# Patient Record
Sex: FEMALE | Race: WHITE | Employment: FULL TIME | URBAN - NONMETROPOLITAN AREA
[De-identification: names, ages, dates, MRNs, and addresses within clinical notes are randomized per-mention and may not be internally consistent; named-entity substitution may affect disease eponyms.]

---

## 2018-08-23 ENCOUNTER — HOSPITAL ENCOUNTER (EMERGENCY)
Age: 43
Discharge: HOME | End: 2018-08-23
Payer: COMMERCIAL

## 2018-08-23 VITALS
HEIGHT: 61 IN | BODY MASS INDEX: 29.27 KG/M2 | DIASTOLIC BLOOD PRESSURE: 88 MMHG | SYSTOLIC BLOOD PRESSURE: 129 MMHG | OXYGEN SATURATION: 98 % | RESPIRATION RATE: 16 BRPM | WEIGHT: 155 LBS | HEART RATE: 74 BPM | TEMPERATURE: 99.2 F

## 2018-08-23 DIAGNOSIS — L25.5 CONTACT DERMATITIS DUE TO PLANT: Primary | ICD-10-CM

## 2018-08-23 PROCEDURE — 6360000002 HC RX W HCPCS: Performed by: NURSE PRACTITIONER

## 2018-08-23 PROCEDURE — 99203 OFFICE O/P NEW LOW 30 MIN: CPT | Performed by: NURSE PRACTITIONER

## 2018-08-23 PROCEDURE — 99202 OFFICE O/P NEW SF 15 MIN: CPT

## 2018-08-23 PROCEDURE — 2709999900 HC NON-CHARGEABLE SUPPLY

## 2018-08-23 PROCEDURE — 96372 THER/PROPH/DIAG INJ SC/IM: CPT

## 2018-08-23 RX ORDER — METHYLPREDNISOLONE ACETATE 80 MG/ML
80 INJECTION, SUSPENSION INTRA-ARTICULAR; INTRALESIONAL; INTRAMUSCULAR; SOFT TISSUE ONCE
Status: COMPLETED | OUTPATIENT
Start: 2018-08-23 | End: 2018-08-23

## 2018-08-23 RX ORDER — PREDNISONE 20 MG/1
TABLET ORAL
Qty: 26 TABLET | Refills: 0 | Status: SHIPPED | OUTPATIENT
Start: 2018-08-23 | End: 2021-07-13

## 2018-08-23 RX ORDER — METHYLPREDNISOLONE ACETATE 40 MG/ML
40 INJECTION, SUSPENSION INTRA-ARTICULAR; INTRALESIONAL; INTRAMUSCULAR; SOFT TISSUE ONCE
Status: COMPLETED | OUTPATIENT
Start: 2018-08-23 | End: 2018-08-23

## 2018-08-23 RX ADMIN — METHYLPREDNISOLONE ACETATE 40 MG: 40 INJECTION, SUSPENSION INTRA-ARTICULAR; INTRALESIONAL; INTRAMUSCULAR; SOFT TISSUE at 19:49

## 2018-08-23 RX ADMIN — METHYLPREDNISOLONE ACETATE 80 MG: 80 INJECTION, SUSPENSION INTRA-ARTICULAR; INTRALESIONAL; INTRAMUSCULAR; SOFT TISSUE at 19:49

## 2018-08-23 NOTE — ED TRIAGE NOTES
Pt ambulatory to [facility] with a rash to her arms, legs, left breast, and genital area. Pt may have been exposed to poison ivy while camping last Sunday. Pt states the rash itches.

## 2018-08-24 ASSESSMENT — ENCOUNTER SYMPTOMS
RHINORRHEA: 0
SINUS PRESSURE: 0
TROUBLE SWALLOWING: 0
STRIDOR: 0
CHEST TIGHTNESS: 0
WHEEZING: 0
ABDOMINAL PAIN: 0
VOMITING: 0
PERI-ORBITAL EDEMA: 0
SHORTNESS OF BREATH: 0
HOARSE VOICE: 0
SORE THROAT: 0
THROAT SWELLING: 0
VOICE CHANGE: 0
COUGH: 0
DIARRHEA: 0
NAUSEA: 0

## 2018-08-24 NOTE — ED PROVIDER NOTES
Neurological: Negative for headaches. PAST MEDICAL HISTORY   No past medical history on file. SURGICAL HISTORY     Patient  has no past surgical history on file. CURRENT MEDICATIONS       Discharge Medication List as of 8/23/2018  7:47 PM          ALLERGIES     Patient is has No Known Allergies. FAMILY HISTORY     Patient's family history is not on file. SOCIAL HISTORY     Patient  reports that she has never smoked. She has never used smokeless tobacco. She reports that she drinks alcohol. She reports that she does not use drugs. PHYSICAL EXAM     ED TRIAGE VITALS  BP: 129/88, Temp: 99.2 °F (37.3 °C), Pulse: 74, Resp: 16, SpO2: 98 %  Physical Exam   Constitutional: She is oriented to person, place, and time. Vital signs are normal. She appears well-developed and well-nourished. Non-toxic appearance. She does not have a sickly appearance. She does not appear ill. No distress. HENT:   Head: Normocephalic and atraumatic. Right Ear: Hearing and external ear normal.   Left Ear: Hearing and external ear normal.   Nose: Nose normal.   Mouth/Throat: Uvula is midline, oropharynx is clear and moist and mucous membranes are normal.   Eyes: Conjunctivae and lids are normal. Right eye exhibits no discharge. Left eye exhibits no discharge. Neck: Normal range of motion. Neck supple. Cardiovascular: Normal rate, regular rhythm, S1 normal, S2 normal and normal heart sounds. No murmur heard. Pulmonary/Chest: Effort normal and breath sounds normal. No accessory muscle usage or stridor. No respiratory distress. She has no decreased breath sounds. She has no wheezes. She has no rhonchi. She has no rales. Neurological: She is alert and oriented to person, place, and time. Skin: Skin is warm, dry and intact. Rash (scattered vesicular rash to left breast, bilateral forearms, lower anterior legs, and vaginal area.  ) noted. Rash is vesicular. She is not diaphoretic. No pallor.    Psychiatric: She has a normal mood and affect. Nursing note and vitals reviewed. DIAGNOSTIC RESULTS   Labs:No results found for this visit on 08/23/18. IMAGING:    URGENT CARE COURSE:     Vitals:    08/23/18 1928 08/23/18 2004   BP: (!) 137/90 129/88   Pulse: 70 74   Resp: 16 16   Temp: 99.2 °F (37.3 °C)    TempSrc: Temporal    SpO2: 97% 98%   Weight: 155 lb (70.3 kg)    Height: 5' 1\" (1.549 m)        Medications   methylPREDNISolone acetate (DEPO-MEDROL) injection 80 mg (80 mg Intramuscular Given 8/23/18 1949)   methylPREDNISolone acetate (DEPO-MEDROL) injection 40 mg (40 mg Intramuscular Given 8/23/18 1949)     PROCEDURES:  None  FINAL IMPRESSION      1. Contact dermatitis due to plant        DISPOSITION/PLAN   DISPOSITION Decision To Discharge 08/23/2018 07:47:06 PM   120 mg Depo Medrol IM given tolerated during encounter  Prednisone taper given   Take Medication as Directed  Benadryl for itch, Don't scratch  Monitor for any increase in size or spreading  Monitor for fever or chills  Keep drainage covered if any. Follow up with your PCP or return as needed  Or go to the emergency Department     PATIENT REFERRED TO:  [address] [facility]  [address] [place] [address]  [phone]  In 1 week  If no improvement of symptoms    Patient instructed to follow up with PCP. If symptoms worsen, become severe or new symptoms develop patient instructed to go to the emergency room immediately. DISCHARGE MEDICATIONS:  Discharge Medication List as of 8/23/2018  7:47 PM      START taking these medications    Details   predniSONE (DELTASONE) 20 MG tablet 60 mg once a day for 4 days 40 mg once a day for 4 days 20 mg once a day for  6 days, Disp-26 tablet, R-0Normal           Discharge Medication List as of 8/23/2018  7:47 PM          Patient given educational materials - see patient instructions. Discussed use, benefit, and side effects of prescribed medications. All patient questions answered.   Pt voiced

## 2019-08-20 ENCOUNTER — HOSPITAL ENCOUNTER (OUTPATIENT)
Dept: GENERAL RADIOLOGY | Age: 44
Discharge: HOME | End: 2019-08-20
Payer: COMMERCIAL

## 2019-08-20 ENCOUNTER — HOSPITAL ENCOUNTER (OUTPATIENT)
Age: 44
Discharge: HOME | End: 2019-08-20
Payer: COMMERCIAL

## 2019-08-20 DIAGNOSIS — M79.641 PAIN OF RIGHT HAND: ICD-10-CM

## 2019-08-20 PROCEDURE — 73130 X-RAY EXAM OF HAND: CPT

## 2021-07-13 ENCOUNTER — OFFICE VISIT (OUTPATIENT)
Dept: PRIMARY CARE CLINIC | Age: 46
End: 2021-07-13

## 2021-07-13 VITALS
DIASTOLIC BLOOD PRESSURE: 88 MMHG | HEIGHT: 61 IN | HEART RATE: 88 BPM | BODY MASS INDEX: 29.45 KG/M2 | RESPIRATION RATE: 20 BRPM | WEIGHT: 156 LBS | SYSTOLIC BLOOD PRESSURE: 132 MMHG | TEMPERATURE: 98.4 F | OXYGEN SATURATION: 100 %

## 2021-07-13 DIAGNOSIS — Z00.00 WELLNESS EXAMINATION: Primary | ICD-10-CM

## 2021-07-13 RX ORDER — MEDROXYPROGESTERONE ACETATE 5 MG/1
TABLET ORAL
COMMUNITY
Start: 2021-06-17 | End: 2021-10-27

## 2021-07-13 RX ORDER — ERYTHROMYCIN 5 MG/G
OINTMENT OPHTHALMIC
Qty: 1 TUBE | Status: CANCELLED | OUTPATIENT
Start: 2021-07-13

## 2021-07-13 RX ORDER — ESTRADIOL 0.5 MG/1
TABLET ORAL
COMMUNITY
Start: 2021-06-17 | End: 2021-10-27

## 2021-07-13 ASSESSMENT — ENCOUNTER SYMPTOMS
CONSTIPATION: 0
PHOTOPHOBIA: 0
ABDOMINAL PAIN: 0
VOMITING: 0
SHORTNESS OF BREATH: 0
DIARRHEA: 0
EYE DISCHARGE: 0
NAUSEA: 0
ABDOMINAL DISTENTION: 0
EYE PAIN: 0
RECTAL PAIN: 1
CHEST TIGHTNESS: 0
EYE ITCHING: 0
EYE REDNESS: 1

## 2021-07-13 ASSESSMENT — PATIENT HEALTH QUESTIONNAIRE - PHQ9
1. LITTLE INTEREST OR PLEASURE IN DOING THINGS: 0
SUM OF ALL RESPONSES TO PHQ9 QUESTIONS 1 & 2: 0
SUM OF ALL RESPONSES TO PHQ QUESTIONS 1-9: 0
SUM OF ALL RESPONSES TO PHQ QUESTIONS 1-9: 0
2. FEELING DOWN, DEPRESSED OR HOPELESS: 0
SUM OF ALL RESPONSES TO PHQ QUESTIONS 1-9: 0

## 2021-07-13 NOTE — PROGRESS NOTES
Michela  [facility] [address] [facility]  Dept: [phone]  Loc: [facility] (:  1975) is a 48 y.o. female,New patient, here for evaluation of the following chief complaint(s):  Establish Care      ASSESSMENT/PLAN:  1. Wellness examination  -     Comprehensive Metabolic Panel; Future  -     TSH with Reflex; Future  -     Lipid Panel; Future  -     Vitamin D 25 Hydroxy; Future      She is to bring in her medications for Migraines and Suppository for Hemorrhoids for refills. She did not have dosing available today. Prior records reviewed from Dr. Laura Bowman office  No [place] records were available. Her right sclera is red injected: no signs of drainage, eye pain reported , dry eye history or any allergie reported. She recently had eye evaluation by her Ophthalmologic provider and she reported eye health was normal.  Denies any blurred vision, floaters, pain or irritation    Recommended otc eye drops to moisten and lubricate the eye and seek treatment if progresses. Body mass index is 29.48 kg/m². Screening:  Routine Dental and Eye  Last pap 2 years: history of partial hysterectomy and ablation  Declines Mammogram    Pending labs for wellness evaluation        No follow-ups on file. SUBJECTIVE/OBJECTIVE:  48year old female here to establish care   Healthy appearing female  She comes from Dr. Laura Bowman in [facility]  She has been treated for Migraines chronically, this was dx in the [place] system    She is taking oral hormones for decreased libido and menopausal type symtpoms with abdominal bloating that [address] prescribed by her OBGYN    She is wanting to establish care for refills of her migraine medication and hemorhoidal treatment  She would also like wellness evaluation      Review of Systems   Constitutional: Negative for chills, diaphoresis, fatigue and fever. HENT: Negative for tinnitus.     Eyes: Positive for redness. Negative for photophobia, pain, discharge, itching and visual disturbance. Respiratory: Negative for chest tightness and shortness of breath. Cardiovascular: Negative for chest pain, palpitations and leg swelling. Gastrointestinal: Positive for rectal pain. Negative for abdominal distention, abdominal pain, constipation, diarrhea, nausea and vomiting. Bloating   Endocrine: Negative for cold intolerance, heat intolerance, polydipsia, polyphagia and polyuria. Genitourinary: Negative for difficulty urinating, dysuria, menstrual problem, pelvic pain and vaginal pain. Decreased libido   Musculoskeletal: Negative for arthralgias. Skin: Negative. Allergic/Immunologic: Negative for environmental allergies. Neurological: Positive for headaches. Negative for tremors and numbness. History of Chronic migraines, cluster type presentation PRN Imitrex   Hematological: Negative for adenopathy. Psychiatric/Behavioral: Negative. Physical Exam  Constitutional:       Appearance: Normal appearance. She is not ill-appearing. HENT:      Head: Normocephalic. Right Ear: Tympanic membrane, ear canal and external ear normal. There is no impacted cerumen. Left Ear: Tympanic membrane, ear canal and external ear normal. There is no impacted cerumen. Nose: Nose normal. No congestion. Mouth/Throat:      Mouth: Mucous membranes are moist.      Pharynx: No oropharyngeal exudate. Eyes:      General: No scleral icterus. Right eye: No discharge. Left eye: No discharge. Extraocular Movements: Extraocular movements intact. Conjunctiva/sclera:      Left eye: Left conjunctiva is injected. No hemorrhage. Pupils: Pupils are equal, round, and reactive to light. Comments: No drainage   Cardiovascular:      Rate and Rhythm: Normal rate and regular rhythm. Pulses: Normal pulses. Heart sounds: Normal heart sounds.    Pulmonary: Effort: Pulmonary effort is normal.      Breath sounds: Normal breath sounds. Abdominal:      General: Abdomen is flat. There is distension. Palpations: Abdomen is soft. Tenderness: There is no abdominal tenderness. There is no guarding. Hernia: No hernia is present. Musculoskeletal:         General: No tenderness. Right upper arm: No swelling. Left upper arm: No swelling. Right wrist: No swelling, tenderness or bony tenderness. Left wrist: No swelling, tenderness or bony tenderness. Cervical back: Normal range of motion. Right lower leg: No edema. Left lower leg: No edema. Comments: + tinel B  + Phalens B  Paresthesia involve middle and index finger B  (reports prior hx, with recent exacerbation in the past year working at [employer])   Skin:     General: Skin is warm and dry. Capillary Refill: Capillary refill takes less than 2 seconds. Neurological:      General: No focal deficit present. Mental Status: She is alert and oriented to person, place, and time. Psychiatric:         Mood and Affect: Mood normal.               Additional Information:    /88   Pulse 88   Temp 98.4 °F (36.9 °C)   Resp 20   Ht 5' 1\" (1.549 m)   Wt 156 lb (70.8 kg)   SpO2 100%   BMI 29.48 kg/m²     An electronic signature was used to authenticate this note.     --Adia Duran, KIKE - CNP

## 2021-07-13 NOTE — PATIENT INSTRUCTIONS
Patient Education        Well Visit, Ages 25 to 48: Care Instructions  Overview     Well visits can help you stay healthy. Your doctor has checked your overall health and may have suggested ways to take good care of yourself. Your doctor also may have recommended tests. At home, you can help prevent illness with healthy eating, regular exercise, and other steps. Follow-up care is a key part of your treatment and safety. Be sure to make and go to all appointments, and call your doctor if you are having problems. It's also a good idea to know your test results and keep a list of the medicines you take. How can you care for yourself at home? · Get screening tests that you and your doctor decide on. Screening helps find diseases before any symptoms appear. · Eat healthy foods. Choose fruits, vegetables, whole grains, protein, and low-fat dairy foods. Limit fat, especially saturated fat. Reduce salt in your diet. · Limit alcohol. If you are a man, have no more than 2 drinks a day or 14 drinks a week. If you are a woman, have no more than 1 drink a day or 7 drinks a week. · Get at least 30 minutes of physical activity on most days of the week. Walking is a good choice. You also may want to do other activities, such as running, swimming, cycling, or playing tennis or team sports. Discuss any changes in your exercise program with your doctor. · Reach and stay at a healthy weight. This will lower your risk for many problems, such as obesity, diabetes, heart disease, and high blood pressure. · Do not smoke or allow others to smoke around you. If you need help quitting, talk to your doctor about stop-smoking programs and medicines. These can increase your chances of quitting for good. · Care for your mental health. It is easy to get weighed down by worry and stress. Learn strategies to manage stress, like deep breathing and mindfulness, and stay connected with your family and community.  If you find you often feel sad or hopeless, talk with your doctor. Treatment can help. · Talk to your doctor about whether you have any risk factors for sexually transmitted infections (STIs). You can help prevent STIs if you wait to have sex with a new partner (or partners) until you've each been tested for STIs. It also helps if you use condoms (male or female condoms) and if you limit your sex partners to one person who only has sex with you. Vaccines are available for some STIs, such as HPV. · Use birth control if it's important to you to prevent pregnancy. Talk with your doctor about the choices available and what might be best for you. · If you think you may have a problem with alcohol or drug use, talk to your doctor. This includes prescription medicines (such as amphetamines and opioids) and illegal drugs (such as cocaine and methamphetamine). Your doctor can help you figure out what type of treatment is best for you. · Protect your skin from too much sun. When you're outdoors from 10 a.m. to 4 p.m., stay in the shade or cover up with clothing and a hat with a wide brim. Wear sunglasses that block UV rays. Even when it's cloudy, put broad-spectrum sunscreen (SPF 30 or higher) on any exposed skin. · See a dentist one or two times a year for checkups and to have your teeth cleaned. · Wear a seat belt in the car. When should you call for help? Watch closely for changes in your health, and be sure to contact your doctor if you have any problems or symptoms that concern you. Where can you learn more? Go to https://azra.healthXMarket. org and sign in to your [employer] account. Enter P072 in the [employer] box to learn more about \"Well Visit, Ages 25 to 48: Care Instructions. \"     If you do not have an account, please click on the \"Sign Up Now\" link. Current as of: May 27, 2020               Content Version: 12.9  © 8610-4927 Healthwise, Incorporated. Care instructions adapted under license by [facility] [place] ([facility] [place]). If you have questions about a medical condition or this instruction, always ask your healthcare professional. [place] [address] any warranty or liability for your use of this information.

## 2021-07-14 DIAGNOSIS — Z00.00 WELLNESS EXAMINATION: ICD-10-CM

## 2021-07-14 LAB
ALBUMIN SERPL-MCNC: 4.3 G/DL (ref 3.5–5.1)
ALP BLD-CCNC: 59 U/L (ref 38–126)
ALT SERPL-CCNC: 21 U/L (ref 11–66)
ANION GAP SERPL CALCULATED.3IONS-SCNC: 11 MEQ/L (ref 8–16)
AST SERPL-CCNC: 20 U/L (ref 5–40)
BILIRUB SERPL-MCNC: 0.3 MG/DL (ref 0.3–1.2)
BUN BLDV-MCNC: 8 MG/DL (ref 7–22)
CALCIUM SERPL-MCNC: 9.3 MG/DL (ref 8.5–10.5)
CHLORIDE BLD-SCNC: 102 MEQ/L (ref 98–111)
CHOLESTEROL, TOTAL: 173 MG/DL (ref 100–199)
CO2: 23 MEQ/L (ref 23–33)
CREAT SERPL-MCNC: 0.7 MG/DL (ref 0.4–1.2)
GFR SERPL CREATININE-BSD FRML MDRD: 89 ML/MIN/1.73M2
GLUCOSE BLD-MCNC: 90 MG/DL (ref 70–108)
HDLC SERPL-MCNC: 60 MG/DL
LDL CHOLESTEROL CALCULATED: 93 MG/DL
POTASSIUM SERPL-SCNC: 3.8 MEQ/L (ref 3.5–5.2)
SODIUM BLD-SCNC: 136 MEQ/L (ref 135–145)
TOTAL PROTEIN: 7.2 G/DL (ref 6.1–8)
TRIGL SERPL-MCNC: 99 MG/DL (ref 0–199)
TSH SERPL DL<=0.05 MIU/L-ACNC: 1.34 UIU/ML (ref 0.4–4.2)
VITAMIN D 25-HYDROXY: 27 NG/ML (ref 30–100)

## 2021-07-28 RX ORDER — SUMATRIPTAN 100 MG/1
100 TABLET, FILM COATED ORAL PRN
Qty: 9 TABLET | Refills: 3 | Status: SHIPPED | OUTPATIENT
Start: 2021-07-28 | End: 2022-07-01 | Stop reason: SDUPTHER

## 2021-07-28 RX ORDER — FLUCONAZOLE 150 MG/1
150 TABLET ORAL ONCE
Qty: 1 TABLET | Refills: 0 | Status: SHIPPED | OUTPATIENT
Start: 2021-07-28 | End: 2021-07-28

## 2021-08-02 ENCOUNTER — OFFICE VISIT (OUTPATIENT)
Dept: PRIMARY CARE CLINIC | Age: 46
End: 2021-08-02

## 2021-08-02 VITALS
OXYGEN SATURATION: 98 % | HEART RATE: 81 BPM | DIASTOLIC BLOOD PRESSURE: 74 MMHG | SYSTOLIC BLOOD PRESSURE: 116 MMHG | WEIGHT: 156 LBS | TEMPERATURE: 97.8 F | HEIGHT: 61 IN | RESPIRATION RATE: 18 BRPM | BODY MASS INDEX: 29.45 KG/M2

## 2021-08-02 DIAGNOSIS — H01.00A BLEPHARITIS OF UPPER AND LOWER EYELIDS OF BOTH EYES, UNSPECIFIED TYPE: ICD-10-CM

## 2021-08-02 DIAGNOSIS — B37.9 CANDIDA INFECTION: ICD-10-CM

## 2021-08-02 DIAGNOSIS — K13.70 ORAL MUCOSAL LESION: ICD-10-CM

## 2021-08-02 DIAGNOSIS — H01.00B BLEPHARITIS OF UPPER AND LOWER EYELIDS OF BOTH EYES, UNSPECIFIED TYPE: ICD-10-CM

## 2021-08-02 DIAGNOSIS — B37.0 ORAL THRUSH: Primary | ICD-10-CM

## 2021-08-02 DIAGNOSIS — H10.33 ACUTE BACTERIAL CONJUNCTIVITIS OF BOTH EYES: ICD-10-CM

## 2021-08-02 RX ORDER — FLUCONAZOLE 150 MG/1
150 TABLET ORAL ONCE
Qty: 1 TABLET | Refills: 0 | Status: SHIPPED | OUTPATIENT
Start: 2021-08-02 | End: 2021-08-02

## 2021-08-02 RX ORDER — ERYTHROMYCIN 5 MG/G
OINTMENT OPHTHALMIC
Qty: 1 TUBE | Refills: 0 | Status: SHIPPED | OUTPATIENT
Start: 2021-08-02 | End: 2021-10-27

## 2021-08-02 ASSESSMENT — ENCOUNTER SYMPTOMS
CONSTIPATION: 0
VOMITING: 0
ABDOMINAL PAIN: 0
NAUSEA: 0
BACK PAIN: 0
SORE THROAT: 0
DIARRHEA: 0

## 2021-08-02 NOTE — PATIENT INSTRUCTIONS
Patient Education        Blepharitis: Care Instructions  Your Care Instructions     Blepharitis is an inflammation or infection of the eyelids. It causes dry, scaly crusts on the eyelids. It can also cause your eyes to itch, burn, and look red. This problem is more common in people who have rosacea, dandruff, skin allergies, or eczema. Home treatment can help you keep your eyes comfortable. Your doctor may also prescribe an ointment to put on your eyelids. Follow-up care is a key part of your treatment and safety. Be sure to make and go to all appointments, and call your doctor if you are having problems. It's also a good idea to know your test results and keep a list of the medicines you take. How can you care for yourself at home? · Wash your eyelids and eyebrows daily with baby shampoo. To wash your eyelids:  ? Place a very warm washcloth over your eyes for about a minute. This will help soften and loosen the crusts on your eyelashes. ? Put a few drops of baby shampoo on a warm washcloth. ? Gently wipe your eyelids. This helps remove any crust. It also cleans your eyelids. ? Rinse well with water. · Be safe with medicines. If your doctor prescribed medicine for you, use it exactly as directed. Call your doctor if you think you are having a problem with your medicine. When should you call for help? Call your doctor now or seek immediate medical care if:    · You have signs of an eye infection, such as:  ? Pus or thick discharge coming from the eye.  ? Redness or swelling around the eye.  ? A fever. Watch closely for changes in your health, and be sure to contact your doctor if:    · You have vision changes.     · You do not get better as expected. Where can you learn more? Go to https://MassHousingpejoseeweb.Gist. org and sign in to your zePASS account. Enter N168 in the meevl box to learn more about \"Blepharitis: Care Instructions. \"     If you do not have an account, please click on the \"Sign Up Now\" link. Current as of: August 31, 2020               Content Version: 12.9  © 2006-2021 [employer]. Care instructions adapted under license by [facility] ([facility]). If you have questions about a medical condition or this instruction, always ask your healthcare professional. [address] any warranty or liability for your use of this information. Patient Education        Candidiasis: Care Instructions  Your Care Instructions  Candidiasis (say \"apg-vma-DQ-uh-janett\") is a yeast infection. Yeast normally lives in your body. But it can cause problems if your body's defenses don't work as they should. Some medicines can increase your chance of getting a yeast infection. These include antibiotics, steroids, and cancer drugs. And some diseases like AIDS and diabetes can make you more likely to get yeast infections. There are different types of yeast infections. [place] is a yeast infection in the mouth. It usually occurs in people with weak immune systems. It causes white patches inside the mouth and throat. Yeast infections of the skin usually occur in skin folds where the skin stays moist. They cause red, oozing patches on your skin. Babies can get these infections under the diaper. People who often wear gloves can get them on their hands. Many women get vaginal yeast infections. They are most common when women take antibiotics. These infections can cause the vagina to itch and burn. They also cause white discharge that looks like cottage cheese. In rare cases, yeast infects the blood. This can cause serious disease. This kind of infection is treated with medicine given through a needle into a vein (IV). After you start treatment, a yeast infection usually goes away quickly. But if your immune system is weak, the infection may come back. Tell your doctor if you get yeast infections often. Follow-up care is a key part of your treatment and safety.  Be sure to make and go to all appointments, and call your doctor if you are having problems. It's also a good idea to know your test results and keep a list of the medicines you take. How can you care for yourself at home? · Take your medicines exactly as prescribed. Call your doctor if you think you are having a problem with your medicine. · Use antibiotics only as directed by your doctor. · Eat yogurt with live cultures. It has bacteria called lactobacillus. It may help prevent some types of yeast infections. · Keep your skin clean and dry. Put powder on moist places. · If you are using a cream or suppository to treat a vaginal yeast infection, don't use condoms or a diaphragm. Use a different type of birth control. · Eat a healthy diet and get regular exercise. This will help keep your immune system strong. When should you call for help? Watch closely for changes in your health, and be sure to contact your doctor if:    · You do not get better as expected. Where can you learn more? Go to https://Hoolai GamespeEco Market.T[employer] Systems. org and sign in to your [employer] account. Enter J098 in the Tech urSelf box to learn more about \"Candidiasis: Care Instructions. \"     If you do not have an account, please click on the \"Sign Up Now\" link. Current as of: February 11, 2021               Content Version: 12.9  © 2565-1952 Healthwise, Incorporated. Care instructions adapted under license by [facility] ([facility]). If you have questions about a medical condition or this instruction, always ask your healthcare professional. [place] [address] any warranty or liability for your use of this information.

## 2021-08-02 NOTE — PROGRESS NOTES
Michela  [facility] [address] [facility]  Dept: [phone]  Loc: [facility] (:  1975) is a 48 y.o. female,Established patient, here for evaluation of the following chief complaint(s):  Red Eye (feels like she has red puffy eyes started on ) and Vaginitis (started last thursday )      ASSESSMENT/PLAN:  1. Oral thrush  -     nystatin (MYCOSTATIN) 887411 UNIT/ML suspension; Take 5 mLs by mouth 4 times daily for 10 days Retain in mouth as long as possible, Oral, 4 TIMES DAILY Starting Mon 2021, Until Thu 2021, For 10 days, Disp-200 mL, R-0, Normal  2. Candida infection  -     fluconazole (DIFLUCAN) 150 MG tablet; Take 1 tablet by mouth once for 1 dose, Disp-1 tablet, R-0Normal  -     PAP SMEAR  -     VAGINAL PATHOGENS DNA PANEL; Future  3. Blepharitis of upper and lower eyelids of both eyes, unspecified type  -     erythromycin (ROMYCIN) 5 MG/GM ophthalmic ointment; Insert 1 cm ribbon into lower lid of affected eyes 3 x a day for 5 days, Disp-1 Tube, R-0, Normal  4. Acute bacterial conjunctivitis of both eyes  5. Oral mucosal lesion  -     Culture, HSV    Follow up in 5-7 days if symptoms do not improve. Eyes she has been intermittently wearing contacts on the weekend. When asked further she wore her contacts over the weekend and symptoms developed in the past 24 hours. She has been advised to avoid makeup and harsh soaps around the eye. Avoid contacts and use eye ointment for symptoms and report if any worsening. No contacts until symptoms resolve. Vaginally no yellow or purulent discharge. Disscharge concern for candida or BV, recent sexual activity  Cultures pending  Oral mucosa the lesions are while and cover the roof of her mouth, she has no erythema to her tonsils, denies sore throat ill symptoms or fever.  She stated the last time these lesions appeared it was in September 2020.  No trouble swallowing so will try oral nystatin. Return if symptoms worsen or fail to improve. SUBJECTIVE/OBJECTIVE:  Maikel Govea comes in for multiple complaints today  She has red flaking skin with irritated eyes. She states this has happened in the past but denies any changes to makeup eye products. She was here lat week for vaginal itching and white discharge started on oral diflucan, today the vaginal symptoms have intensified with clear drainage, vaginal itching and she states she feels like she a rash. She now has reported that she has developed oral lesions on the upper palate of her mouth and reports it feels like it is burning or like she has burned the roof of her mouth    She has a red spot on her hand, hear the base of the thumb she feels is attributed to these symptoms and states usually when these symptoms present that they re occurs every 6 months. No blisters or pain    She denies STI or any infection history, is  same sexual partner  She is on oral hormone therapy for menopausal symptoms    Vaginal Discharge  The patient's primary symptoms include vaginal discharge. This is a recurrent problem. The current episode started in the past 7 days. The problem occurs constantly. The problem has been gradually worsening. The patient is experiencing no pain. She is not pregnant. Associated symptoms include rash. Pertinent negatives include no abdominal pain, anorexia, back pain, chills, constipation, diarrhea, discolored urine, dysuria, fever, flank pain, frequency, headaches, hematuria, joint pain, nausea, painful intercourse, sore throat, urgency or vomiting. The vaginal discharge was milky, clear, copious, white and thin. There has been no bleeding. She has not been passing clots. She has not been passing tissue. The symptoms are aggravated by intercourse. She has tried antifungals for the symptoms. The treatment provided no relief. She is sexually active.  No, her partner does not have an STD. Contraceptive use: oral hormones for menopausal symptoms. Her past medical history is significant for vaginosis. There is no history of an abdominal surgery, a  section, an ectopic pregnancy, endometriosis, a gynecological surgery, herpes simplex, menorrhagia, metrorrhagia, miscarriage, ovarian cysts, perineal abscess, PID, an STD or a terminated pregnancy. Review of Systems   Constitutional: Negative for chills and fever. HENT: Negative for sore throat. Gastrointestinal: Negative for abdominal pain, anorexia, constipation, diarrhea, nausea and vomiting. Genitourinary: Positive for vaginal discharge. Negative for dysuria, flank pain, frequency, hematuria, menorrhagia and urgency. Musculoskeletal: Negative for back pain and joint pain. Skin: Positive for rash. Neurological: Negative for headaches. Physical Exam  Exam conducted with a chaperone present. Eyes:      General:         Right eye: Discharge present. Left eye: Discharge present. Conjunctiva/sclera:      Right eye: Right conjunctiva is injected. Left eye: Left conjunctiva is injected. Comments: Eye tissue red inflamed and peeling  Sclera reddened   Genitourinary:     General: Normal vulva. Pubic Area: No rash. Labia:         Right: No rash or lesion. Left: No rash or lesion. Urethra: No urethral pain or urethral swelling. Vagina: Vaginal discharge present. Rectum: Normal.          Comments: Thin clear vaginal discharge, mild erythema below vaginal os, no noted blisiters or lesions  With the examination with insertion of the speculum did note thicker white curdled discharge. Lymphadenopathy:      Lower Body: No right inguinal adenopathy. No left inguinal adenopathy.                Additional Information:    /74   Pulse 81   Temp 97.8 °F (36.6 °C)   Resp 18   Ht 5' 1\" (1.549 m)   Wt 156 lb (70.8 kg)   SpO2 98%   BMI 29.48 kg/m² An electronic signature was used to authenticate this note.     --Marvin Epstein, APRN - CNP

## 2021-08-05 LAB
CANDIDA SPECIES, DNA PROBE: NEGATIVE
GARDNERELLA VAGINALIS, DNA PROBE: NEGATIVE
HERPES SIMPLEX CULTURE: NORMAL
TRICHOMONAS VAGINALIS DNA: NEGATIVE

## 2021-08-09 LAB — CYTOLOGY THIN PREP PAP: NORMAL

## 2021-10-27 ENCOUNTER — OFFICE VISIT (OUTPATIENT)
Dept: PRIMARY CARE CLINIC | Age: 46
End: 2021-10-27

## 2021-10-27 VITALS
RESPIRATION RATE: 18 BRPM | OXYGEN SATURATION: 98 % | HEIGHT: 61 IN | HEART RATE: 78 BPM | SYSTOLIC BLOOD PRESSURE: 116 MMHG | WEIGHT: 157 LBS | DIASTOLIC BLOOD PRESSURE: 78 MMHG | BODY MASS INDEX: 29.64 KG/M2

## 2021-10-27 DIAGNOSIS — R20.2 NUMBNESS AND TINGLING IN BOTH HANDS: Primary | ICD-10-CM

## 2021-10-27 DIAGNOSIS — R20.0 NUMBNESS AND TINGLING IN BOTH HANDS: Primary | ICD-10-CM

## 2021-10-27 NOTE — PROGRESS NOTES
Michela  [facility] [address] [facility]  Dept: [phone]  Loc: [facility] (:  1975) is a 48 y.o. female,Established patient, here for evaluation of the following chief complaint(s):  Hand Pain (wrist pain down into hands, states the right is worse than the left, right hand dominant. Patient wants to be worked up for possible carpal tunnel )      ASSESSMENT/PLAN:  1. Numbness and tingling in both hands  -     External Referral To Orthopedic Surgery  -     [facility]. [facility] ([facility]) - Isabell Mohs, MD Karderrick Chew has a PCP but wishes to utilize the [facility] at [employer] today for the referral so that she does not have to miss work to schedule appointment with her PCP as typically absences are not approved and affect time off and bonus structure at [employer] her employer. I will notify PCP and make referral for EMG and Orthopedics as she requests due to examination findings and presentation with history. At this time she prefers to see providers in the [facility] and will fax referral to [address] [place] [address] per Sarita's request.     Plan:  [employer] [employer] as previous use. Diagnostic testing ordered  Provided with [employer] padded gloves today as she declines wearing wrist splints while working. The taping system she purchased online for relief of hand wrist discomfort has provided her with some relief she reports but not full resolution of symtpoms and reports the treatment is extremely costly monthly for taping. We discussed forearm discomfort and tenderness at the lateral epicondyl of the right elbow. With examination I feel is related to muscular discomfort of the flexor tendons due to possible lateral epicondylitis. We discused conservative treatments and elbow strap. Declines at this time.       Additional Examination Details:  ________________________________    Neck ROM WNL  No trapezoid  discomfort bilaterally or pain reported in B shoulders or B upper arms above the elbows. Return if symptoms worsen or fail to improve. SUBJECTIVE/OBJECTIVE:  Chevy Powers presents today for concerns for bilateral hand tingling numbness and progressive weakness that has been progressing for several months. She states symptoms became moderately problematic since March. She reports symptoms are worse in the Right which is her dominant hand, but also affects her left  The numbness and tingling is constant but fluctuates with intensity with use. Symptoms involve primarily 5-3rd fingers on both hands  Alyssa Frankel also reports pain in the right forearm that has been increasing for last few weeks and describes as an muscle aching with tenderness with gripping, push pulling. Denies a history of  neck pain or upper trap shoulder discomfort. 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 Hand Pain   Incident onset: symptoms started several months ago. There was no injury mechanism. The pain is present in the right fingers, right wrist, right hand, left wrist, left hand, left forearm and right elbow (Worse at night). The quality of the pain is described as aching and shooting. The pain radiates to the right hand and left hand. The pain is at a severity of 5/10. The pain is moderate. The pain has been fluctuating since the incident. Associated symptoms include muscle weakness, numbness and tingling. The symptoms are aggravated by movement and palpation. She has tried immobilization and NSAIDs for the symptoms. The treatment provided no relief. Denies joint locking. She denies dropping items.  She has no obvious deformity noted however;   + tinel's and Phalens reported Bilaterally Hands with examination today. Review of Systems   Neurological: Positive for tingling and numbness. Physical Exam  Constitutional:       Appearance: Normal appearance. Cardiovascular:      Rate and Rhythm: Normal rate. Pulmonary:      Effort: Pulmonary effort is normal.   Musculoskeletal:         General: No swelling. Right upper arm: Normal.      Left upper arm: Normal.      Right elbow: No swelling or deformity. Normal range of motion. Tenderness present. Right forearm: Tenderness present. Left forearm: Normal.      Right wrist: No swelling or bony tenderness. Normal range of motion. Normal pulse. Left wrist: No swelling or bony tenderness. Normal range of motion. Normal pulse. Right hand: Normal range of motion. Decreased strength. Normal strength of finger abduction, thumb/finger opposition and wrist extension. Decreased sensation of the ulnar distribution, median distribution and radial distribution. There is no disruption of two-point discrimination. Normal capillary refill. Normal pulse. Left hand: No tenderness. Normal range of motion. Decreased strength. Normal strength of finger abduction, thumb/finger opposition and wrist extension. Decreased sensation of the ulnar distribution, median distribution and radial distribution. There is no disruption of two-point discrimination. Normal capillary refill. Normal pulse. Arms:         Hands:    Skin:     General: Skin is warm and dry. Capillary Refill: Capillary refill takes less than 2 seconds. Coloration: Skin is not jaundiced or pale. Findings: No bruising, erythema, lesion or rash. Neurological:      Mental Status: She is alert and oriented to person, place, and time. Motor: Weakness present. Deep Tendon Reflexes: Reflexes are normal and symmetric.    Psychiatric:         Behavior: Behavior normal.       Tested  Strength today in [facility]:  Left: 36.0 psi  Right 32.3 psi    Left push 68.2#  Right push:66.8#        Additional Information:    /78   Pulse 78   Resp 18   Ht 5' 1\" (1.549 m)   Wt 157 lb (71.2 kg)   SpO2 98%   BMI 29.66 kg/m²     An electronic signature was used to authenticate this note.     --Guzman Gerard, KIKE - CNP

## 2021-10-27 NOTE — PATIENT INSTRUCTIONS
Patient Education        Numbness and Tingling: Care Instructions  Your Care Instructions     Many things can cause numbness or tingling. Swelling may put pressure on a nerve. This could cause you to lose feeling or have a pins-and-needles sensation on part of your body. Nerves may be damaged from trauma, toxins, or diseases, such as diabetes or multiple sclerosis (MS). Sometimes, though, the cause is not clear. If there is no clear reason for your symptoms, and you are not having any other symptoms, your doctor may suggest watching and waiting for a while to see if the numbness or tingling goes away on its own. Your doctor may want you to have blood or nerve tests to find the cause of your symptoms. Follow-up care is a key part of your treatment and safety. Be sure to make and go to all appointments, and call your doctor if you are having problems. It's also a good idea to know your test results and keep a list of the medicines you take. How can you care for yourself at home? · If your doctor prescribes medicine, take it exactly as directed. Call your doctor if you think you are having a problem with your medicine. · If you have any swelling, put ice or a cold pack on the area for 10 to 20 minutes at a time. Put a thin cloth between the ice and your skin. When should you call for help? Call 911 anytime you think you may need emergency care. For example, call if:    · You have weakness, numbness, or tingling in both legs.     · You lose bowel or bladder control.     · You have symptoms of a stroke. These may include:  ? Sudden numbness, tingling, weakness, or loss of movement in your face, arm, or leg, especially on only one side of your body. ? Sudden vision changes. ? Sudden trouble speaking. ? Sudden confusion or trouble understanding simple statements. ? Sudden problems with walking or balance. ? A sudden, severe headache that is different from past headaches.    Watch closely for changes in your I and love and you, and be sure to contact your doctor if you have any problems, or if:    · You do not get better as expected. Where can you learn more? Go to [email or web address]://chpepiceweb.vSocial. org and sign in to your uromovie account. Enter O920 in the Spare to Share box to learn more about \"Numbness and Tingling: Care Instructions. \"     If you do not have an account, please click on the \"Sign Up Now\" link. Current as of: April 8, 2021               Content Version: 13.0  © 8430-1761 Healthwise, Incorporated. Care instructions adapted under license by [facility] ([facility]). If you have questions about a medical condition or this instruction, always ask your healthcare professional. [address] any warranty or liability for your use of this information.

## 2021-11-04 DIAGNOSIS — K64.8 OTHER HEMORRHOIDS: Primary | ICD-10-CM

## 2021-11-04 RX ORDER — HYDROCORTISONE ACETATE 25 MG/1
25 SUPPOSITORY RECTAL EVERY 12 HOURS
Qty: 24 SUPPOSITORY | Refills: 0 | Status: SHIPPED | OUTPATIENT
Start: 2021-11-04 | End: 2022-03-18

## 2022-03-18 ENCOUNTER — OFFICE VISIT (OUTPATIENT)
Dept: PRIMARY CARE CLINIC | Age: 47
End: 2022-03-18

## 2022-03-18 VITALS
SYSTOLIC BLOOD PRESSURE: 124 MMHG | HEART RATE: 68 BPM | WEIGHT: 157 LBS | DIASTOLIC BLOOD PRESSURE: 70 MMHG | HEIGHT: 61 IN | OXYGEN SATURATION: 98 % | BODY MASS INDEX: 29.64 KG/M2 | RESPIRATION RATE: 18 BRPM

## 2022-03-18 DIAGNOSIS — Z76.89 RETURN TO WORK EVALUATION: Primary | ICD-10-CM

## 2022-03-18 RX ORDER — NICOTINE POLACRILEX 2 MG
GUM BUCCAL
COMMUNITY

## 2022-03-18 RX ORDER — VITAMIN B COMPLEX
1 CAPSULE ORAL DAILY
COMMUNITY

## 2022-03-18 ASSESSMENT — VISUAL ACUITY: OU: 1

## 2022-03-18 ASSESSMENT — ENCOUNTER SYMPTOMS
COLOR CHANGE: 0
RESPIRATORY NEGATIVE: 1

## 2022-03-18 NOTE — PROGRESS NOTES
Subjective:      Patient ID: Ryann Canales is a 50 y.o. female. Pt presents for return to work physical post carpal tunnel release. Right release was 01/06/2022 and Left was 01/21/2022. She states she notices pulling and weakness to lateral edges of bilateral palms with can holding and has some mild intermittent incisional pain that self resolves. She also reports intermittent swelling of bilateral hands that required medrol dose pack and OIO expressed that she could anticipate intermittent swelling throughout healing. She has not tried lifting too much at home but is wanting to return to return to duty without restrictions on 03/21/2022. She has clearance to return without restrictions as of 03/21/2022 from Dr. Maryam José MD from [facility] as long as she does not have consistent pain. Pt will have to go through lifting, carrying, pushing/pulling,  and reaching tasks of FCE required by [employer]. She reports needing minimal OTC medications and is to only follow up as needed with OIO. Pt states she can do her job requirements but wants her company to \"Understand its going to take some time\". Review of Systems   Constitutional: Negative. Negative for chills, diaphoresis and fever. Respiratory: Negative. Cardiovascular: Negative. Musculoskeletal: Positive for arthralgias, joint swelling (intermittent to hands) and myalgias. Skin: Negative. Negative for color change and wound. Healed incisions bilateral wrists   Neurological: Negative for weakness (bilateral hands with certain positions). Objective:   Physical Exam  Vitals and nursing note reviewed. Constitutional:       General: She is not in acute distress. Appearance: Normal appearance. She is not ill-appearing or toxic-appearing. HENT:      Head: Normocephalic and atraumatic. Nose: Nose normal.      Mouth/Throat:      Lips: Pink. Eyes:      General: Lids are normal. Vision grossly intact.       Extraocular Movements: Extraocular movements intact. Conjunctiva/sclera: Conjunctivae normal.      Pupils: Pupils are equal, round, and reactive to light. Cardiovascular:      Rate and Rhythm: Normal rate and regular rhythm. Pulses: Normal pulses. Heart sounds: Normal heart sounds. No murmur heard. No friction rub. No gallop. Pulmonary:      Effort: Pulmonary effort is normal. No respiratory distress. Abdominal:      General: Abdomen is flat. There is no distension. Tenderness: There is no guarding. Musculoskeletal:         General: Normal range of motion. Right wrist: No swelling. Normal range of motion. Normal pulse. Left wrist: No swelling. Normal range of motion. Normal pulse. Right hand: Tenderness (minimal deep palpation of incisional sites) present. No swelling or bony tenderness. Normal range of motion. Normal strength. Normal sensation. There is no disruption of two-point discrimination. Normal capillary refill. Left hand: Tenderness (minimal deep palpation of incisional sites) present. No swelling or bony tenderness. Normal range of motion. Normal strength. Normal sensation. There is no disruption of two-point discrimination. Normal capillary refill. Cervical back: Full passive range of motion without pain, normal range of motion and neck supple. No tenderness. Comments: 4/5 strength with pincher grasp resistance    Skin:     General: Skin is warm and dry. Comments: Healed incisions bilateral palms, closed and without infection or swelling    Neurological:      General: No focal deficit present. Mental Status: She is alert and oriented to person, place, and time. Psychiatric:         Attention and Perception: Attention normal.         Mood and Affect: Mood normal.         Behavior: Behavior normal. Behavior is cooperative. Based on FCE created for [email or web address], patient met all criteria. Sarita's  strength was 53.4# on right and 52. 0# on left.  She was able to reach above head and shoulder level 25 times for both. She was able to lift 35# from floor to waist and waist to shoulder 8 times and 3 times for 50#. She was able to carry 35# a distance of 20ft. She was able to pull 85.4# right 84.6# left and push 107# right, 107# left. Assessment:       Diagnosis Orders   1. Return to work evaluation             Plan:      OK to return to work without restrictions as of 03/21/2022. Able to complete FCE within standards by company and clearance from Ortho on this date. To return as needed.          Jerome Anderson, APRN - CNP

## 2022-04-07 ENCOUNTER — SCHEDULED TELEPHONE ENCOUNTER (OUTPATIENT)
Dept: PRIMARY CARE CLINIC | Age: 47
End: 2022-04-07

## 2022-04-07 DIAGNOSIS — M79.642 PAIN OF LEFT HAND: Primary | ICD-10-CM

## 2022-04-07 PROBLEM — G56.00 CARPAL TUNNEL SYNDROME: Status: ACTIVE | Noted: 2022-04-07

## 2022-04-07 ASSESSMENT — ENCOUNTER SYMPTOMS: COLOR CHANGE: 0

## 2022-04-07 NOTE — LETTER
[address] [facility] [address] [facility]  Phone: [phone]  Fax: [phone]    KIKE Herrera CNP        April 7, 2022     Patient: Melchor Akhtar   YOB: 1975   Date of Visit: 4/7/2022       To Whom It May Concern: It is my medical opinion that Chad Aviles may return to light duty immediately with the following restrictions: NO PUSH PULL USE OF LEFT HAND FOR , NO POUNDING, 20# WITH LEFT HAND. .    If you have any questions or concerns, please don't hesitate to call.     Sincerely,        KIKE Herrera CNP

## 2022-04-07 NOTE — PATIENT INSTRUCTIONS
PADDED GLOVES  RESTRICTIONS AS ORDERED  CONSERVATIVE MGMT: ANTI INFLAMMATORY AND OR TYLENOL PRN  ICE MASSAGE  FOLLOW UP IN ONE WEEK OR SOONER IF SYMPTOMS WORSEN. Patient Education        Hand Pain: Care Instructions  Your Care Instructions     Common causes of hand pain are overuse and injuries, such as might happen during sports or home repair projects. Everyday wear and tear, especially asyou get older, also can cause hand pain. Most minor hand injuries will heal on their own, and home treatment is usually all you need to do. If you have sudden and severe pain, you may need tests andtreatment. Follow-up care is a key part of your treatment and safety. Be sure to make and go to all appointments, and call your doctor if you are having problems. It's also a good idea to know your test results and keep alist of the medicines you take. How can you care for yourself at home?  Take pain medicines exactly as directed. ? If the doctor gave you a prescription medicine for pain, take it as prescribed. ? If you are not taking a prescription pain medicine, ask your doctor if you can take an over-the-counter medicine.  Rest and protect your hand. Take a break from any activity that may cause pain.  Put ice or a cold pack on your hand for 10 to 20 minutes at a time. Put a thin cloth between the ice and your skin.  Prop up the sore hand on a pillow when you ice it or anytime you sit or lie down during the next 3 days. Try to keep it above the level of your heart. This will help reduce swelling.  If your doctor recommends a sling, splint, or elastic bandage to support your hand, wear it as directed. When should you call for help? Call 911 anytime you think you may need emergency care. For example, call if:     Your hand turns cool or pale or changes color.    Call your doctor now or seek immediate medical care if:     You cannot move your hand.      Your hand pops, moves out of its normal position, and then returns to its normal position.      You have signs of infection, such as:  ? Increased pain, swelling, warmth, or redness. ? Red streaks leading from the sore area. ? Pus draining from a place on your hand. ? A fever.      Your hand feels numb or tingly. Watch closely for changes in your health, and be sure to contact your doctor if:     Your hand feels unstable when you try to use it.      You do not get better as expected.      You have any new symptoms, such as swelling.      Bruises from an injury to your hand last longer than 2 weeks. Where can you learn more? Go to https://EventappeMaintenanceNeteb.StudioSnaps. org and sign in to your Big Screen Tools account. Enter R273 in the RETC box to learn more about \"Hand Pain: Care Instructions. \"     If you do not have an account, please click on the \"Sign Up Now\" link. Current as of: July 1, 2021               Content Version: 13.2  © 2006-2022 Healthwise, Incorporated. Care instructions adapted under license by [facility] ([facility]). If you have questions about a medical condition or this instruction, always ask your healthcare professional. [place] [address] any warranty or liability for your use of this information.

## 2022-04-07 NOTE — PROGRESS NOTES
[place]  [facility] [address] 22 [address] [facility]  Dept: [phone]  Loc: [facility] (:  1975) is a 50 y.o. female,Established patient, here for evaluation of the following chief complaint(s):  No chief complaint on file. ASSESSMENT/PLAN:  1. Pain of left hand    Concern for pillar pain post op carpal tunnel repair: suggested conservative mgmt. Provided with padded gloves to reduce pressure to the palm surface for padding. Recommended ice, stretches and use of padded ergo gloves for work  Did provide pt with mild restrictions to avoid exertional force/pressure on the left palm and follow up in one week. She asos ws advised she may want to discuss with her surgeon  She voiced understanding of instructions    Return in about 1 week (around 2022), or if symptoms worsen or fail to improve. 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 She denied having any pain or problems when she ws released. Review of Systems   Constitutional: Negative. Musculoskeletal: Positive for myalgias. Negative for arthralgias and joint swelling. Skin: Negative for color change. Neurological: Negative for tremors, weakness and numbness. Physical Exam  Constitutional:       Appearance: Normal appearance. Musculoskeletal:         General: Tenderness present.       Left hand: Swelling and tenderness present. No bony tenderness. Normal sensation. There is no disruption of two-point discrimination. Normal capillary refill. Normal pulse. Arms:       Comments: Swelling and tenderness with palpation to the palm of the left hand at the site of the transverse carpal ligament/prior surgery     Skin:     General: Skin is warm and dry. Capillary Refill: Capillary refill takes less than 2 seconds. Findings: No bruising, erythema or lesion. Neurological:      Mental Status: She is alert and oriented to person, place, and time. Sensory: Sensation is intact. Motor: Motor function is intact. Normal ROM to fingers and wrist of the lelft extremity. Sensation intact intact  No signs of neurovascular or circulatory compromise  healed scar noted      Additional Information:    There were no vitals taken for this visit. An electronic signature was used to authenticate this note.     --KIKE Hutchinson - CNP

## 2022-04-11 ENCOUNTER — OFFICE VISIT (OUTPATIENT)
Dept: PRIMARY CARE CLINIC | Age: 47
End: 2022-04-11

## 2022-04-11 DIAGNOSIS — M79.642 PAIN OF LEFT HAND: Primary | ICD-10-CM

## 2022-04-11 NOTE — PATIENT INSTRUCTIONS
Restrictions revised  Follow up with Surgeon  See me back in one week or sooner if any concerns  Instructions as in prior appointment

## 2022-04-11 NOTE — LETTER
[address] [facility] [address] [facility]  Phone: [phone]  Fax: [phone]    KIKE Powell CNP        April 11, 2022     Patient: Jeannette Jordan   YOB: 1975   Date of Visit: 4/11/2022       To Whom It May Concern: It is my medical opinion that Araseli Grade recommend no push pull or pounding force greater than 20# left palm, lifting 20# left upper extremity. will evaluate in one week from today 4/18/22    If you have any questions or concerns, please don't hesitate to call.     Sincerely,        KIKE Powell CNP

## 2022-04-11 NOTE — PROGRESS NOTES
Michela  [facility] [address] [facility]  Dept: [phone]  [facility]  1975[facility] is a 50 y.o. female,Established patient, here for evaluation of the following chief complaint(s):  Pain (left hand pain, states she has been moved and cannot do her role that they placed her in, she did call her [facility] doctor this morning and had to leave a voicemail. )      ASSESSMENT/PLAN:  1. Pain of left hand    Concern for pillar pain post op carpal tunnel repair: suggested conservative mgmt. Provided with padded gloves to reduce pressure to the palm surface for padding. Today was moved to new area and she is not tolerating the new role. She is now excessively using her right hand with the restrictions I recommended. She would like to return to her previous role. I called her Supervisor Don Borapolina and discussed her needs, since the past restrictions stated light duty they moved her to a new area. I indicated that I would like to reduce the force for push pull and lift to her left ahand and he feels she can go back to her prior role and they can accommodate those requests. She is also going to speak with her Surgeon and has left a message today for him. We will pad the palm of the hand, she will wear the ergo padded gloves and RTW    Her symptoms slightly improved over weekend with regards to her left palm swelling and pain. Again recommended rest as able. She called off work Friday and reports that gave her additional time to rest the hands    PRN use of ICE and OTC meds as discussed  Full AROM  No paresthesias today. Recommended ice, stretches and use of padded ergo gloves for work  Did provide pt with mild restrictions to avoid exertional force/pressure on the left palm and follow up in one week.    She also was advised she may want to discuss with her surgeon  She voiced understanding of instructions    She is agreeable to poc and understands restrictions and instrucitons  Return in about 1 week (around 4/18/2022). 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 Pain and mild swelling  isolated to thenar region and transverse carpal region of her left palm  No neurovascular concerns today  No redness or signs of drainage, scar is healed  Palpable edema in soft tissue present    Additionally she report that  RTW her  surgeon wanted to keep her off work longer but did release her 2 weeks ago. She denied having any pain or problems when she was released. Review of Systems   Constitutional: Negative. Musculoskeletal: Positive for myalgias. Negative for arthralgias and joint swelling. Skin: Negative for color change. Neurological: Negative for tremors, weakness and numbness. Physical Exam  Constitutional:       Appearance: Normal appearance. Musculoskeletal:         General: Tenderness present. Left hand: Swelling and tenderness present. No bony tenderness. Normal sensation. There is no disruption of two-point discrimination. Normal capillary refill. Normal pulse. Arms:       Comments: Swelling and tenderness with palpation to the palm of the left hand at the site of the transverse carpal ligament/prior surgery     Skin:     General: Skin is warm and dry. Capillary Refill: Capillary refill takes less than 2 seconds. Findings: No bruising, erythema or lesion. Neurological:      Mental Status: She is alert and oriented to person, place, and time. Sensory: Sensation is intact. Motor: Motor function is intact. Normal ROM to fingers and wrist of the lelft extremity. Sensation intact intact  No signs of neurovascular or circulatory compromise  healed scar noted      Additional Information:    There were no vitals taken for this visit.

## 2022-04-18 ENCOUNTER — OFFICE VISIT (OUTPATIENT)
Dept: PRIMARY CARE CLINIC | Age: 47
End: 2022-04-18

## 2022-04-18 VITALS
HEIGHT: 61 IN | HEART RATE: 92 BPM | RESPIRATION RATE: 18 BRPM | OXYGEN SATURATION: 98 % | SYSTOLIC BLOOD PRESSURE: 136 MMHG | DIASTOLIC BLOOD PRESSURE: 70 MMHG | BODY MASS INDEX: 29.64 KG/M2 | WEIGHT: 157 LBS

## 2022-04-18 DIAGNOSIS — G56.03 BILATERAL CARPAL TUNNEL SYNDROME: Primary | ICD-10-CM

## 2022-04-18 NOTE — PROGRESS NOTES
Michela  [facility] [address] [facility]  Dept: [phone]  Loc: [facility] (:  1975) is a 50 y.o. female,Established patient, here for evaluation of the following chief complaint(s):  Pain (left hand pain, states she has been moved and cannot do her role that they placed her in, she did call her [facility] doctor this morning and had to leave a voicemail. )      ASSESSMENT/PLAN:  1. Pain of bilateral hands    Concern for pillar pain post op carpal tunnel repair: with repetitive use and push pull with force to her palms. I suggested conservative mgmt   She adds she did see her surgeon who felt the issue was swelling, no formal restrictions were given. Prior visit provided with padded gloves to reduce pressure to the palm surface for padding however again she reports today now was moved to new area and not able to wear today. 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 She would like to return to her previous role. I called her Supervisors to try and discussed her needs. This is her new role, she has been moved. She will be required to perform this role. 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 I indicated that I would like to reduce the force for push pull and reduce gripping requirements for another week to allow the tissues to heal and rest. She has been advised to wear the ergo padded gloves. PRN use of ICE and OTC meds as discussed  Full AROM  No paresthesias today. Recommended ice, stretches and use of padded ergo gloves for work  Did provide pt with mild restrictions to avoid exertional force/pressure on the left palm and follow up in one week. She also was advised she may want to discuss with her surgeon  She voiced understanding of instructions    She is agreeable to poc and understands restrictions and instrucitons  Return in about 1 week if needed    SUBJECTIVE/OBJECTIVE:  Kiana stops in today for follow up for evaluation of the pain in the left palm and now right palm. 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 Continued Pain and mild swelling  isolated to thenar region and transverse carpal region of her left palm, now with pain in the MCP joints B    No neurovascular concerns today  No redness or signs of drainage, scar is healed  Palpable edema in soft tissue present in palms and thenar regions of B hands      Additionally she report that  RTW her  surgeon wanted to keep her off work longer but did release her 2 weeks ago. She denied having any pain or problems when she was released. Review of Systems   Constitutional: Negative. Musculoskeletal: Positive for myalgias. Negative for arthralgias and joint swelling. Skin: Negative for color change. Neurological: Negative for tremors, weakness and numbness. Physical Exam  Constitutional:       Appearance: Normal appearance. Musculoskeletal:         General: Tenderness present. Left hand: Swelling and tenderness present. No bony tenderness. Normal sensation. There is no disruption of two-point discrimination. Normal capillary refill. Normal pulse. Arms:       Comments: Swelling and tenderness with palpation to the palm of the right hand at the site of the transverse carpal ligament/prior surgery, resolving inflammation of this araa on the left. Skin:     General: Skin is warm and dry. Capillary Refill: Capillary refill takes less than 2 seconds. Findings: No bruising, erythema or lesion.    Neurological:      Mental Status: She is alert and oriented to person, place, and time. Sensory: Sensation is intact. Motor: Motor function is intact.          Normal ROM to fingers and wrist  Bilateral  Sensation intact , No signs of neurovascular or circulatory compromise  healed scar noted      Additional Information:  /70   Pulse 92   Resp 18   Ht 5' 1\" (1.549 m)   Wt 157 lb (71.2 kg)   SpO2 98%   BMI 29.66 kg/m²

## 2022-06-20 RX ORDER — SUMATRIPTAN 100 MG/1
TABLET, FILM COATED ORAL
Qty: 9 TABLET | Refills: 0 | OUTPATIENT
Start: 2022-06-20

## 2022-07-01 RX ORDER — SUMATRIPTAN 100 MG/1
100 TABLET, FILM COATED ORAL PRN
Qty: 9 TABLET | Refills: 3 | Status: SHIPPED | OUTPATIENT
Start: 2022-07-01

## 2023-04-18 ENCOUNTER — HOSPITAL ENCOUNTER (OUTPATIENT)
Dept: OCCUPATIONAL THERAPY | Age: 48
Setting detail: THERAPIES SERIES
Discharge: HOME | End: 2023-04-18
Payer: COMMERCIAL

## 2023-04-18 PROCEDURE — 97110 THERAPEUTIC EXERCISES: CPT

## 2023-04-18 PROCEDURE — 97166 OT EVAL MOD COMPLEX 45 MIN: CPT

## 2023-04-18 NOTE — PROGRESS NOTES
** PLEASE SIGN, DATE AND TIME CERTIFICATION BELOW AND RETURN TO [facility] OUTPATIENT REHABILITATION (FAX #: [phone]). ATTEST/CO-SIGN IF ACCESSING VIA INUllink. THANK YOU.**    I certify that I have examined the patient below and determined that Physical Medicine and Rehabilitation service is necessary and that I approve the established plan of care for up to 90 days or as specifically noted. Attestation, signature or co-signature of physician indicates approval of certification requirements.    ________________________ ____________ __________  Physician Signature   Date   Time   [address] THERAPY  [x] EVALUATION  [] DAILY NOTE (LAND) [] DAILY NOTE (AQUATIC ) [] PROGRESS NOTE [] DISCHARGE NOTE    [] [facility]   [] [facility]    [] [address] [facility]   [x] Charlotte Hood    Date: 2023  Patient Name:  Keena Sargent  : 1975  MRN: [id number]  CSN: [id number]    Referring Practitioner Zhane Persaud DO   Diagnosis Carpal tunnel syndrome, bilateral upper limbs [G56.03] , Thoracic outlet syndrome   Treatment Diagnosis B hand weakness, pain in B hands   Date of Evaluation 23      Functional Outcome Measure Used [facility]   Functional Outcome Score 41/100 (23)       Insurance: Primary: Payor: [employer] /  /  / ,   Secondary:    Authorization Information: PRE CERTIFICATION REQUIRED: NO  INSURANCE THERAPY BENEFIT:  30 VISITS PER [facility]: YES  MODALITIES COVERED:  YES   Visit # 1, 1/10 for progress note   Visits Allowed: 30   Recertification Date: 2023   Physician Follow-Up: May 12th, 2023   Physician Orders: Murlene Smithfield and tx, work specific strengthening   Pertinent History: Pt is 47 y/o F who reports she underwent R CTR on 2022 and L CTR on 2022 with Dr. Deo Montesinos.  Pt reports she was off work for ~10-11 weeks and reports no issues with

## 2023-04-26 ENCOUNTER — HOSPITAL ENCOUNTER (OUTPATIENT)
Dept: OCCUPATIONAL THERAPY | Age: 48
Setting detail: THERAPIES SERIES
Discharge: HOME | End: 2023-04-26
Payer: COMMERCIAL

## 2023-04-26 PROCEDURE — 97140 MANUAL THERAPY 1/> REGIONS: CPT

## 2023-04-26 PROCEDURE — 97110 THERAPEUTIC EXERCISES: CPT

## 2023-04-26 NOTE — PROGRESS NOTES
to open packages. *  Patient will improve PRWE to <28  *  Patient will increase  strength to >30 lbs at setting 2 in B hands for increased ability to return to work. Patient Education:   [x]  HEP/Education Completed: Plan of Care, Goals, HEP  [facility] Access Code for HEP:   FDS glide MF  Theraputty drags for digit ext  Digit ext lifts off table  Theraputty grasp  Wedges btw digits with comp fist and hook fist AROM alternating with end range ext  Scalene stretches, Scapular wall slides, chip clip finger AROM  []  No new Education completed  []  Reviewed Prior HEP      [x]  Patient verbalized and/or demonstrated understanding of education provided. []  Patient unable to verbalize and/or demonstrate understanding of education provided. Will continue education. [x]  Barriers to learning: none    PLAN:  Treatment Recommendations: Strengthening, Range of Motion, Neuromuscular Re-education, Manual Therapy - Soft Tissue Mobilization, Manual Therapy - Joint Manipulation, Pain Management, Home Exercise Program, Patient Education, Safety Education and Training, Modalities, and Splint Fabrications/Modifications    []  Plan of care initiated. Plan to see patient 1 times per week for 6-8 weeks to address the treatment planned outlined above.   [x]  Continue with current plan of care  []  Modify plan of care as follows:    []  Hold pending physician visit  []  Discharge    Time In 1046   Time Out 1128   Timed Code Minutes: 42 min   Total Treatment Time: 42 min       Electronically Signed by: Kamari Staples OT

## 2025-08-11 ENCOUNTER — TRANSCRIBE ORDERS (OUTPATIENT)
Dept: ADMINISTRATIVE | Age: 50
End: 2025-08-11

## 2025-08-11 DIAGNOSIS — Z12.39 ENCOUNTER FOR OTHER SCREENING FOR MALIGNANT NEOPLASM OF BREAST: Primary | ICD-10-CM
